# Patient Record
Sex: FEMALE | URBAN - METROPOLITAN AREA
[De-identification: names, ages, dates, MRNs, and addresses within clinical notes are randomized per-mention and may not be internally consistent; named-entity substitution may affect disease eponyms.]

---

## 2019-12-17 ENCOUNTER — APPOINTMENT (RX ONLY)
Dept: URBAN - METROPOLITAN AREA CLINIC 87 | Facility: CLINIC | Age: 39
Setting detail: DERMATOLOGY
End: 2019-12-17

## 2019-12-17 VITALS — HEIGHT: 62 IN | WEIGHT: 117 LBS

## 2019-12-17 DIAGNOSIS — L67.0 TRICHORRHEXIS NODOSA: ICD-10-CM

## 2019-12-17 PROBLEM — L90.5 SCAR CONDITIONS AND FIBROSIS OF SKIN: Status: ACTIVE | Noted: 2019-12-17

## 2019-12-17 PROBLEM — L81.3 CAFÉ AU LAIT SPOTS: Status: ACTIVE | Noted: 2019-12-17

## 2019-12-17 PROBLEM — Q84.2 OTHER CONGENITAL MALFORMATIONS OF HAIR: Status: ACTIVE | Noted: 2019-12-17

## 2019-12-17 PROCEDURE — ? COUNSELING

## 2019-12-17 PROCEDURE — 99202 OFFICE O/P NEW SF 15 MIN: CPT

## 2019-12-17 PROCEDURE — ? TREATMENT REGIMEN

## 2019-12-17 ASSESSMENT — LOCATION ZONE DERM: LOCATION ZONE: SCALP

## 2019-12-17 ASSESSMENT — LOCATION DETAILED DESCRIPTION DERM: LOCATION DETAILED: RIGHT MEDIAL FRONTAL SCALP

## 2019-12-17 ASSESSMENT — LOCATION SIMPLE DESCRIPTION DERM: LOCATION SIMPLE: RIGHT SCALP

## 2019-12-17 NOTE — PROCEDURE: TREATMENT REGIMEN
Plan: I discussed with the pt that most likely her chemical treatment about 7 months ago probably caused damage to the hair which will need to grow out. During this time, pt should try to avoid any further damage to the hair. In addition, pt will continue to follow up with her thyroid disease and keep it managed at normal levels.
Detail Level: Zone
Discontinue Regimen: Recommend avoiding any processing that can damage the hair including bleaching or dyeing the hair; any heat or chemical processing techniques; excessive hair brushing
Continue Regimen: Rogaine, biotin

## 2019-12-17 NOTE — HPI: HAIR LOSS
Previous Labs: No
How Did The Hair Loss Occur?: gradual in onset
How Severe Is Your Hair Loss?: moderate
Additional History: Pt reports that she has been using Rogaine and biotin. She has noticed hair is growing, but she is still getting hair breakage.